# Patient Record
Sex: FEMALE | URBAN - METROPOLITAN AREA
[De-identification: names, ages, dates, MRNs, and addresses within clinical notes are randomized per-mention and may not be internally consistent; named-entity substitution may affect disease eponyms.]

---

## 2022-10-14 ENCOUNTER — OFFICE VISIT (OUTPATIENT)
Dept: URBAN - METROPOLITAN AREA CLINIC 113 | Facility: CLINIC | Age: 22
End: 2022-10-14

## 2022-10-14 NOTE — HPI-TODAY'S VISIT:
This is a 22-year-old female with a history of attention deficit disorder, cholelithiasis status postcholecystectomy, abdominal pain, and diarrhea referred from Milan Rodriguez NP for evaluation of abdominal pain and diarrhea status postcholecystectomy (March 2022). According to notes, she had diarrhea prior to cholecystectomy.  She was prescribed cholestyramine prior to surgery.

## 2022-10-17 ENCOUNTER — CLAIMS CREATED FROM THE CLAIM WINDOW (OUTPATIENT)
Dept: URBAN - METROPOLITAN AREA CLINIC 107 | Facility: CLINIC | Age: 22
End: 2022-10-17
Payer: COMMERCIAL

## 2022-10-17 ENCOUNTER — WEB ENCOUNTER (OUTPATIENT)
Dept: URBAN - METROPOLITAN AREA CLINIC 107 | Facility: CLINIC | Age: 22
End: 2022-10-17

## 2022-10-17 VITALS
TEMPERATURE: 98.3 F | BODY MASS INDEX: 27.55 KG/M2 | WEIGHT: 161.4 LBS | HEIGHT: 64 IN | SYSTOLIC BLOOD PRESSURE: 127 MMHG | HEART RATE: 101 BPM | DIASTOLIC BLOOD PRESSURE: 83 MMHG

## 2022-10-17 DIAGNOSIS — R19.7 ACUTE DIARRHEA: ICD-10-CM

## 2022-10-17 DIAGNOSIS — R19.7 DIARRHEA, UNSPECIFIED TYPE: ICD-10-CM

## 2022-10-17 PROCEDURE — 99243 OFF/OP CNSLTJ NEW/EST LOW 30: CPT | Performed by: INTERNAL MEDICINE

## 2022-10-17 PROCEDURE — 99243 OFF/OP CNSLTJ NEW/EST LOW 30: CPT

## 2022-10-17 PROCEDURE — 99203 OFFICE O/P NEW LOW 30 MIN: CPT | Performed by: INTERNAL MEDICINE

## 2022-10-17 RX ORDER — COLESEVELAM HYDROCHLORIDE 625 MG/1
1 TABLET TABLET, COATED ORAL TWICE A DAY
Qty: 180 TABLET | Refills: 2 | OUTPATIENT
Start: 2022-10-17

## 2022-10-17 RX ORDER — SULFAMETHOXAZOLE AND TRIMETHOPRIM 800; 160 MG/1; MG/1
1 TABLET TABLET ORAL TWICE A DAY
Status: ACTIVE | COMMUNITY

## 2022-10-17 NOTE — HPI-TODAY'S VISIT:
Ms. Villanueva is a 22-year-old woman with a history of ADD and cholecystitis s/p cholecystectomy 3/2022 referred by Qing Rodriguez NP for evaluation of ongoing abdominal pain and diarrhea. Patient reports her symptoms began 2 years ago, and include abdominal pain with eating, gas pains, and post prandial diarrhea. She had an abdominal ultrasound last year that showed gallstones, we do not have this to review. She proceeded with cholecystectomy 3/2022. She reports symptoms have continued s/p gall bladder removal. She reports that the abdominal pain will begin during her meals, and she will have to leave the table to use the restroom. She gets post prandial loose stools without blood, but with mucous. She states that these symptoms will go on for weeks at a time, and then not bother her for 1-2 days between. She always has post prandial loose stool and has not identified any food triggers. She has never tried anything for the symptoms. She did have a negative celiac panel and h. pylori breath test 2/4/22. Her CBC and CMP at that time were unremarkable.

## 2022-11-17 ENCOUNTER — OFFICE VISIT (OUTPATIENT)
Dept: URBAN - METROPOLITAN AREA CLINIC 107 | Facility: CLINIC | Age: 22
End: 2022-11-17

## 2022-11-17 RX ORDER — SULFAMETHOXAZOLE AND TRIMETHOPRIM 800; 160 MG/1; MG/1
1 TABLET TABLET ORAL TWICE A DAY
Status: ACTIVE | COMMUNITY

## 2022-11-17 RX ORDER — COLESEVELAM HYDROCHLORIDE 625 MG/1
1 TABLET TABLET, COATED ORAL TWICE A DAY
Qty: 180 TABLET | Refills: 2 | Status: ACTIVE | COMMUNITY
Start: 2022-10-17

## 2023-07-18 ENCOUNTER — TELEPHONE ENCOUNTER (OUTPATIENT)
Dept: URBAN - METROPOLITAN AREA CLINIC 113 | Facility: CLINIC | Age: 23
End: 2023-07-18

## 2023-08-23 ENCOUNTER — OFFICE VISIT (OUTPATIENT)
Dept: URBAN - METROPOLITAN AREA CLINIC 107 | Facility: CLINIC | Age: 23
End: 2023-08-23
Payer: COMMERCIAL

## 2023-08-23 ENCOUNTER — LAB OUTSIDE AN ENCOUNTER (OUTPATIENT)
Dept: URBAN - METROPOLITAN AREA CLINIC 107 | Facility: CLINIC | Age: 23
End: 2023-08-23

## 2023-08-23 ENCOUNTER — DASHBOARD ENCOUNTERS (OUTPATIENT)
Age: 23
End: 2023-08-23

## 2023-08-23 VITALS
SYSTOLIC BLOOD PRESSURE: 123 MMHG | DIASTOLIC BLOOD PRESSURE: 86 MMHG | WEIGHT: 165 LBS | TEMPERATURE: 97.5 F | HEART RATE: 115 BPM | RESPIRATION RATE: 18 BRPM | BODY MASS INDEX: 28.17 KG/M2 | HEIGHT: 64 IN

## 2023-08-23 DIAGNOSIS — R12 HEARTBURN: ICD-10-CM

## 2023-08-23 DIAGNOSIS — R10.84 GENERALIZED ABDOMINAL PAIN: ICD-10-CM

## 2023-08-23 DIAGNOSIS — R19.7 DIARRHEA: ICD-10-CM

## 2023-08-23 DIAGNOSIS — R15.9 INCONTINENCE OF FECES: ICD-10-CM

## 2023-08-23 PROCEDURE — 99214 OFFICE O/P EST MOD 30 MIN: CPT | Performed by: NURSE PRACTITIONER

## 2023-08-23 RX ORDER — POLYETHYLENE GLYCOL 3350, SODIUM SULFATE, SODIUM CHLORIDE, POTASSIUM CHLORIDE, ASCORBIC ACID, SODIUM ASCORBATE 140-9-5.2G
AS DIRECTED KIT ORAL ONCE
Qty: 140 GRAMS | Refills: 0 | OUTPATIENT
Start: 2023-08-23 | End: 2023-08-24

## 2023-08-23 RX ORDER — COLESEVELAM HYDROCHLORIDE 625 MG/1
1 TABLET TABLET, COATED ORAL TWICE A DAY
Qty: 180 TABLET | Refills: 2 | Status: ON HOLD | COMMUNITY
Start: 2022-10-17

## 2023-08-23 RX ORDER — SULFAMETHOXAZOLE AND TRIMETHOPRIM 800; 160 MG/1; MG/1
1 TABLET TABLET ORAL TWICE A DAY
Status: ON HOLD | COMMUNITY

## 2023-08-23 NOTE — HPI-OTHER HISTORIES
Negative celiac panel and h. pylori breath test 2/4/22. Her CBC and CMP at that time were unremarkable.

## 2023-08-23 NOTE — HPI-TODAY'S VISIT:
Ms. Villanueva is a 23-year-old female with a history of cholelithiasis and chronic cholecystitis s/p cholecystectomy referred back to our office by Sarthak Boone PA-C for evaluation of esophagitis.  She was last seen 10/17/22 for evaluation of postprandial abdominal cramping and diarrhea. She was started on Welchol for possible bile salt diarrhea s/p cholecystectomy. She did not return for follow-up.  She returns today for re-evaluation of her diarrhea. She was seen in the ER in June for atypical chest pain felt to be related to GERD, prompting this referral. She was started on pantoprazole which she was unable to tolerate due to worsening heartburn and excessive gas. She discontinued the pantoprazole and infrequent heartburn responds to Tums if needed. She has not experienced any further chest pain but was told she may need an upper endoscopy.  She continues to struggle with diarrhea, which has been ongoing for the last 3 years. Her symptoms initially started during pregnancy. She had imaging which showed gallstones prompting cholecystectomy in early 2022, but her symptoms are unchanged. She experiences diarrhea with any oral intake. She is only having an average of 2 bowel movements per day, but admits to liquid stools and significant urgency which has resulted in incontinence. She has excessive gas and diffuse abdominal cramping while eating, resulting in the urge to have a bowel movement. The pain resolves with defecation. She denies any nausea, vomiting, red blood per rectum, or nighttime bowel movements. She has been discussing her symptoms with her cousin who is a surgeon in [place]. She has tried both Welchol and cholestyramine in the past which worsened her diarrhea.

## 2023-10-17 ENCOUNTER — OFFICE VISIT (OUTPATIENT)
Dept: URBAN - METROPOLITAN AREA SURGERY CENTER 25 | Facility: SURGERY CENTER | Age: 23
End: 2023-10-17

## 2023-11-22 ENCOUNTER — OFFICE VISIT (OUTPATIENT)
Dept: URBAN - METROPOLITAN AREA CLINIC 107 | Facility: CLINIC | Age: 23
End: 2023-11-22

## 2024-09-19 NOTE — PHYSICAL EXAM HENT:
Head, normocephalic, atraumatic, Face, Face within normal limits, Ears, External ears within normal limits Duration Of Freeze Thaw-Cycle (Seconds): 0 Show Aperture Variable?: Yes Detail Level: Detailed Render Post-Care Instructions In Note?: no Consent: The patient's consent was obtained including but not limited to risks of crusting, scabbing, blistering, scarring, darker or lighter pigmentary change, recurrence, incomplete removal and infection. Post-Care Instructions: I reviewed with the patient in detail post-care instructions. Patient is to wear sunprotection, and avoid picking at any of the treated lesions. Pt may apply Vaseline to crusted or scabbing areas.